# Patient Record
Sex: MALE | Race: BLACK OR AFRICAN AMERICAN | NOT HISPANIC OR LATINO | Employment: STUDENT | URBAN - METROPOLITAN AREA
[De-identification: names, ages, dates, MRNs, and addresses within clinical notes are randomized per-mention and may not be internally consistent; named-entity substitution may affect disease eponyms.]

---

## 2023-04-26 ENCOUNTER — HOSPITAL ENCOUNTER (EMERGENCY)
Facility: HOSPITAL | Age: 28
Discharge: HOME | End: 2023-04-26
Attending: EMERGENCY MEDICINE | Admitting: EMERGENCY MEDICINE
Payer: MEDICAID

## 2023-04-26 VITALS
TEMPERATURE: 97.8 F | DIASTOLIC BLOOD PRESSURE: 101 MMHG | WEIGHT: 215 LBS | OXYGEN SATURATION: 96 % | RESPIRATION RATE: 18 BRPM | SYSTOLIC BLOOD PRESSURE: 172 MMHG | BODY MASS INDEX: 28.56 KG/M2 | HEART RATE: 86 BPM

## 2023-04-26 DIAGNOSIS — S05.01XA ABRASION OF RIGHT CORNEA, INITIAL ENCOUNTER: ICD-10-CM

## 2023-04-26 PROCEDURE — 99283 EMERGENCY DEPT VISIT LOW MDM: CPT

## 2023-04-26 RX ORDER — TETRACAINE HYDROCHLORIDE 5 MG/ML
1-2 SOLUTION OPHTHALMIC ONCE
Status: DISCONTINUED | OUTPATIENT
Start: 2023-04-26 | End: 2023-04-26 | Stop reason: HOSPADM

## 2023-04-26 RX ORDER — ERYTHROMYCIN 5 MG/G
0.5 OINTMENT OPHTHALMIC 4 TIMES DAILY
Qty: 10 G | Refills: 0 | Status: SHIPPED | OUTPATIENT
Start: 2023-04-26 | End: 2023-05-01

## 2023-04-26 ASSESSMENT — VISUAL ACUITY
OD: 20/30
OS: 20/20
OU: NORMAL
OU: 20/20

## 2023-04-26 ASSESSMENT — ACTIVITIES OF DAILY LIVING (ADL): ADLS_ACUITY_SCORE: 35

## 2023-04-26 NOTE — DISCHARGE INSTRUCTIONS
You were seen in the Emergency Department today for evaluation of eye pain.  On exam you were found to have a corneal abrasion.  This should heal with time.  Use Tylenol and ibuprofen as needed for discomfort.  You were prescribed erythromycin ointment. You should take all medications as prescribed.  Follow up with your primary care physician to ensure resolution of symptoms. Return if you have new or worsening symptoms.

## 2023-04-26 NOTE — ED PROVIDER NOTES
EMERGENCY DEPARTMENT ENCOUNTER      NAME: Ulises Samson  AGE: 27 year old male  YOB: 1995  MRN: [id number]  EVALUATION DATE & TIME: 4/26/2023  4:23 PM    PCP: Alfred Montgomery    ED PROVIDER: Amira Phillips M.D.      Chief Complaint   Patient presents with     Eye Pain     FINAL IMPRESSION:  1. Abrasion of right cornea, initial encounter      ED COURSE & MEDICAL DECISION MAKING:    Pertinent Labs & Imaging studies reviewed. (See chart for details)  ED Course as of 04/26/23 1715   Wed Apr 26, 2023   1652 Patient is a pleasant 27-year-old male comes in today for evaluation of irritation to his right eye.  He was cutting a piece of chocolate today and did not know if he hit it with the knife or with the piece of chocolate.  He just complains of pain.  Mom came home from work and noticed that his eye was red and he was rubbing it so brought him in for evaluation.  He does not wear contacts or glasses.  He had not taken anything for pain.  On initial exam he has some bilateral conjunctivitis that looks worse on the right.  He was having a lot of tearing.  He is not having significant photophobia.  Extraocular movements are intact.  Pupils equally round bilaterally.  On slit-lamp exam I was able to see a defect both at the 7 o'clock position and at the 2 o'clock position consistent with a corneal abrasion.  We did fluorescein staining in that area did light up.  He had a negative Felicity sign I do not see any sign of globe rupture.  Patient got good relief with the tetracaine.  I recommended Tylenol and ibuprofen at home and that he use the erythromycin ointment which I have prescribed.  They are in agreement with the plan.  His vision was close to normal.  He will be discharged home with his mom with a prescription.       4:33 PM Introduced myself to the patient and his mother, obtained history of present illness, and performed initial physical exam at this time. Performed Slit lamp and Wood's lamp  procedure.   4:42 PM Discussed discharge with the patient's mother. She is agreeable with this plan.     Medical Decision Making    History:    Supplemental history from: Patient's mom    External Record(s) reviewed: None    Work Up:    Emergent/Severe conditions considered and evaluated for: Corneal abrasion, corneal ulcer, globe rupture, iritis    I independently reviewed and interpreted none    In additional to work up documented, I considered the following work up: None    Medications given that require intensive monitoring for toxicity: None    External consultation:    Discussion of management with another provider: None    Complicating factors:    Care impacted by chronic illness: Autism    Care affected by social determinants of health: None    Disposition considerations: Discharge  Prescriptions considered/prescribed: Erythromycin ointment    At the conclusion of the encounter I discussed  the results of all of the tests and the disposition with mom.   All questions were answered.  The mom acknowledged understanding and was involved in the decision making regarding the overall care plan.      I discussed with mom the utility, limitations and findings of the exam/interventions/studies done during this visit as well as the list of differential diagnosis and symptoms to monitor/return to ER for.  Additional verbal discharge instructions were provided.     MEDICATIONS GIVEN IN THE EMERGENCY:  Medications   tetracaine (PONTOCAINE) 0.5 % ophthalmic solution 1-2 drop (has no administration in time range)   fluorescein (FUL-JORGE) ophthalmic strip 1 strip (has no administration in time range)   fluorescein (FUL-JORGE) ophthalmic strip 1 strip (has no administration in time range)   tetracaine (PONTOCAINE) 0.5 % ophthalmic solution 1-2 drop (has no administration in time range)       NEW PRESCRIPTIONS STARTED AT TODAY'S ER VISIT  Discharge Medication List as of 4/26/2023  4:59 PM      START taking these medications     Details   erythromycin (ROMYCIN) 5 MG/GM ophthalmic ointment Place 0.5 inches into the right eye 4 times daily for 5 daysDisp-10 g, [id number]                =================================================================    HPI    Triage Note: The pt presents for evaluation of R sided eye pain with redness. He was cutting a chocolate bar, and it is unclear if a piece of the chocolate flew up and hit him in the eye, or if he somehow poked his eye with the knife. No obvious puncture, however his R eye is quite reddened. L eye is slightly red as well. Pt is autistic.    Patient information was obtained from: the patient    Use of : N/A       Ulises Samson is a 27 year old male with a medical history of autism, seizures, and ADHD, who presents for evaluation of eye pain.     This morning around 0530, the patient hit his right eye with either the handle of a knife or a hard candy bar. His mother states that she was at work all day, and was unsure what actually hit his eye. He does not normally wear contacts or glasses. The patient's right eye is red, watery, and painful. His mother notes that his left eye has also started watering. The patient notes that he is not having any vision problems, just pain. No tylenol or ibuprofen at home yet. No other complaints at this time.     PAST MEDICAL HISTORY:  Past Medical History:   Diagnosis Date     Autism      Seizures (H)        PAST SURGICAL HISTORY:  Past Surgical History:   Procedure Laterality Date     ------------OTHER-------------      MRI     ------------OTHER-------------      Oral surgery     EXAM UNDER ANESTHESIA, RESTORATIONS, EXTRACTION(S) DENTAL COMPLEX, COMBINED N/A 10/9/2014    Procedure: COMBINED EXAM UNDER ANESTHESIA, RESTORATIONS, EXTRACTION(S) DENTAL COMPLEX;  Surgeon: Mariia Alas DDS;  Location: [facility] OR       CURRENT MEDICATIONS:      Current Facility-Administered Medications:      fluorescein (FUL-JORGE) ophthalmic strip 1 strip, 1  strip, Both Eyes, Once, Layne Romero MD     fluorescein (FUL-JORGE) ophthalmic strip 1 strip, 1 strip, Both Eyes, Once, Amira Phillips MD     tetracaine (PONTOCAINE) 0.5 % ophthalmic solution 1-2 drop, 1-2 drop, Both Eyes, Once, Layne Romero MD     tetracaine (PONTOCAINE) 0.5 % ophthalmic solution 1-2 drop, 1-2 drop, Both Eyes, Once, Amira Phillips MD    Current Outpatient Medications:      erythromycin (ROMYCIN) 5 MG/GM ophthalmic ointment, Place 0.5 inches into the right eye 4 times daily for 5 days, Disp: 10 g, Rfl: 0     cloNIDine (CATAPRES) 0.1 MG tablet, Take 0.2 mg by mouth At Bedtime, Disp: , Rfl:      FLUoxetine (PROZAC) 40 MG capsule, Take 40 mg by mouth daily, Disp: , Rfl:      levETIRAcetam (KEPPRA) 500 MG tablet, Take 2,000 mg by mouth 2 times daily, Disp: , Rfl:      melatonin 3 MG tablet, Take by mouth nightly as needed for sleep, Disp: , Rfl:      methylphenidate (CONCERTA) 36 MG CR tablet, Take 36 mg by mouth every morning, Disp: , Rfl:      OXcarbazepine (TRILEPTAL) 150 MG tablet, Take 150 mg by mouth 2 times daily, Disp: , Rfl:     ALLERGIES:  No Known Allergies    FAMILY HISTORY:  No family history on file.    SOCIAL HISTORY:   Social History     Socioeconomic History     Marital status: Single   Tobacco Use     Smoking status: Passive Smoke Exposure - Never Smoker   Substance and Sexual Activity     Alcohol use: No     Drug use: No       PHYSICAL EXAM    VITAL SIGNS: BP (!) 172/101   Pulse 86   Temp 97.8  F (36.6  C) (Temporal)   Resp 18   Wt 97.5 kg (215 lb)   SpO2 96%   BMI 28.56 kg/m     GENERAL: Awake, alert, answering questions appropriately, appears uncomfortable   EYE: Patient right eye has a corneal abrasion at the 7 o'clock position with small amount of uptake at the 2 o'clock position.  Bilateral conjunctivitis  SPEECH:  Easy to understand speech, Normal volume and javi  PULMONARY: No respiratory distress  PSYCH: Normal mood and affect      PROCEDURES:     PROCEDURE: Slit lamp and Woods lamp Exam   INDICATIONS:  Right eye pain   PROCEDURE PROVIDER: Dr Amira Phillips   SITE: right eye   CONSENT:  The risks, benefits and alternatives for this procedure were explained to the patient and his mother, and verbally accepted.     MEDICATION: fluorescein stain and tetracaine   EXAM FINDINGS: Right Eye: Corneal abrasion at the 7 o'clock position and 2 o'clock position, negative Felicity sign  Left Eye: No corneal abrasion   COMPLICATIONS: Patient tolerated procedure well, without complication       I, Jeni Sousa, am serving as a scribe to document services personally performed by Dr. Phillips based on my observation and the provider's statements to me. I, Amira Phillips MD attest that Jeni Sousa is acting in a scribe capacity, has observed my performance of the services and has documented them in accordance with my direction.    Amira Phillips M.D.  Emergency Medicine  [facility] EMERGENCY DEPARTMENT  [facility] [facility] [id number] [phone]  Dept: [phone]       Aimra Phillips MD  04/26/23 7114

## 2023-04-26 NOTE — ED TRIAGE NOTES
The pt presents for evaluation of R sided eye pain with redness. He was cutting a chocolate bar, and it is unclear if a piece of the chocolate flew up and hit him in the eye, or if he somehow poked his eye with the knife. No obvious puncture, however his R eye is quite reddened. L eye is slightly red as well. Pt is autistic.